# Patient Record
Sex: MALE | Race: AMERICAN INDIAN OR ALASKA NATIVE
[De-identification: names, ages, dates, MRNs, and addresses within clinical notes are randomized per-mention and may not be internally consistent; named-entity substitution may affect disease eponyms.]

---

## 2017-04-10 ENCOUNTER — HOSPITAL ENCOUNTER (INPATIENT)
Dept: HOSPITAL 31 - C.ER | Age: 57
LOS: 5 days | Discharge: HOME | DRG: 745 | End: 2017-04-15
Attending: PSYCHIATRY & NEUROLOGY | Admitting: PSYCHIATRY & NEUROLOGY
Payer: MEDICAID

## 2017-04-10 DIAGNOSIS — F11.23: Primary | ICD-10-CM

## 2017-04-10 DIAGNOSIS — F19.10: ICD-10-CM

## 2017-04-10 DIAGNOSIS — G47.00: ICD-10-CM

## 2017-04-10 DIAGNOSIS — Z87.891: ICD-10-CM

## 2017-04-10 LAB
ALBUMIN/GLOB SERPL: 1.4 {RATIO} (ref 1–2.1)
ALP SERPL-CCNC: 72 U/L (ref 38–126)
ALT SERPL-CCNC: 21 U/L (ref 21–72)
AST SERPL-CCNC: 20 U/L (ref 17–59)
BASOPHILS # BLD AUTO: 0 K/UL (ref 0–0.2)
BASOPHILS NFR BLD: 0.4 % (ref 0–2)
BILIRUB SERPL-MCNC: < 0.1 MG/DL (ref 0.2–1.3)
BILIRUB UR-MCNC: NEGATIVE MG/DL
BUN SERPL-MCNC: 12 MG/DL (ref 9–20)
CALCIUM SERPL-MCNC: 8.9 MG/DL (ref 8.6–10.4)
CHLORIDE SERPL-SCNC: 100 MMOL/L (ref 98–107)
CO2 SERPL-SCNC: 26 MMOL/L (ref 22–30)
EOSINOPHIL # BLD AUTO: 0.5 K/UL (ref 0–0.7)
EOSINOPHIL NFR BLD: 6.8 % (ref 0–4)
ERYTHROCYTE [DISTWIDTH] IN BLOOD BY AUTOMATED COUNT: 13.4 % (ref 11.5–14.5)
ETHANOL SERPL-MCNC: < 10 MG/DL (ref 0–10)
GLOBULIN SER-MCNC: 2.6 GM/DL (ref 2.2–3.9)
GLUCOSE SERPL-MCNC: 121 MG/DL (ref 75–110)
GLUCOSE UR STRIP-MCNC: NORMAL MG/DL
HCT VFR BLD CALC: 40.2 % (ref 35–51)
KETONES UR STRIP-MCNC: NEGATIVE MG/DL
LEUKOCYTE ESTERASE UR-ACNC: (no result) LEU/UL
LYMPHOCYTES # BLD AUTO: 2 K/UL (ref 1–4.3)
LYMPHOCYTES NFR BLD AUTO: 24.6 % (ref 20–40)
MCH RBC QN AUTO: 30.8 PG (ref 27–31)
MCHC RBC AUTO-ENTMCNC: 32.6 G/DL (ref 33–37)
MCV RBC AUTO: 94.4 FL (ref 80–94)
MONOCYTES # BLD: 0.5 K/UL (ref 0–0.8)
MONOCYTES NFR BLD: 5.8 % (ref 0–10)
NRBC BLD AUTO-RTO: 0 % (ref 0–2)
PH UR STRIP: 6 [PH] (ref 5–8)
PLATELET # BLD: 258 K/UL (ref 130–400)
PMV BLD AUTO: 7.3 FL (ref 7.2–11.7)
POTASSIUM SERPL-SCNC: 4.6 MMOL/L (ref 3.6–5.2)
PROT SERPL-MCNC: 6.2 G/DL (ref 6.3–8.3)
PROT UR STRIP-MCNC: NEGATIVE MG/DL
RBC # UR STRIP: NEGATIVE /UL
RBC #/AREA URNS HPF: < 1 /HPF (ref 0–3)
SODIUM SERPL-SCNC: 137 MMOL/L (ref 132–148)
SP GR UR STRIP: 1.02 (ref 1–1.03)
UROBILINOGEN UR-MCNC: NORMAL MG/DL (ref 0.2–1)
WBC # BLD AUTO: 8.1 K/UL (ref 4.8–10.8)
WBC #/AREA URNS HPF: 1 /HPF (ref 0–5)

## 2017-04-10 PROCEDURE — GZHZZZZ GROUP PSYCHOTHERAPY: ICD-10-PCS | Performed by: PSYCHIATRY & NEUROLOGY

## 2017-04-10 PROCEDURE — GZ56ZZZ INDIVIDUAL PSYCHOTHERAPY, SUPPORTIVE: ICD-10-PCS | Performed by: PSYCHIATRY & NEUROLOGY

## 2017-04-10 PROCEDURE — HZ2ZZZZ DETOXIFICATION SERVICES FOR SUBSTANCE ABUSE TREATMENT: ICD-10-PCS | Performed by: PSYCHIATRY & NEUROLOGY

## 2017-04-10 NOTE — C.PDOC
History Of Present Illness


56-year-old male, presents to the emergency department requesting detox from 

heroin. Patient states he is not using IV. Patients last use was yesterday. 

Denies any SI/HI. No other complaints at this time.


Time Seen by Provider: 04/10/17 13:12


Chief Complaint (Nursing): Substance Abuse


History Per: Patient


History/Exam Limitations: no limitations


Onset/Duration Of Symptoms: Days


Current Symptoms Are (Timing): Still Present





Past Medical History


Reviewed: Historical Data, Nursing Documentation, Vital Signs


Vital Signs: 


 Last Vital Signs











Temp  98 F   04/10/17 16:37


 


Pulse  62   04/10/17 16:37


 


Resp  18   04/10/17 16:37


 


BP  121/73   04/10/17 16:37


 


Pulse Ox  100   04/10/17 17:28














- Medical History


PMH: Back Problems


   Denies: Diabetes, Hepatitis, HIV, HTN, Seizures, Sexually Transmitted Disease





- [facility] Procedures








DETOXIFICATION SERVICES FOR SUBSTANCE ABUSE TREATMENT (04/09/16)


DRUG DETOXIFICATION (01/09/15)








Family History: States: Unknown Family Hx





- Social History


Hx Tobacco Use: Yes


Hx Alcohol Use: Yes


Hx Substance Use: Yes (heroin)





- Immunization History


Hx Tetanus Toxoid Vaccination: No


Hx Influenza Vaccination: No


Hx Pneumococcal Vaccination: No





Review Of Systems


Except As Marked, All Systems Reviewed And Found Negative.


Constitutional: Negative for: Fever, Chills


Cardiovascular: Negative for: Chest Pain, Palpitations


Respiratory: Negative for: Shortness of Breath


Skin: Negative for: Rash


Neurological: Negative for: Weakness, Numbness


Psych: Negative for: Anxiety, Depression, Suicidal ideation





Physical Exam





- Physical Exam


Appears: Non-toxic, No Acute Distress


Skin: Warm, Dry, No Rash


Head: Atraumatic, Normacephalic


Eye(s): bilateral: Normal Inspection, EOMI


Nose: Normal


Oral Mucosa: Moist


Lips: Normal Appearing


Neck: Normal ROM


Chest: Symmetrical


Respiratory: No Accessory Muscle Use


Gastrointestinal/Abdominal: Soft, No Tenderness


Extremity: Normal ROM


Neurological/Psych: Oriented x3





ED Course And Treatment





- Laboratory Results


Result Diagrams: 


 04/10/17 13:40





 04/10/17 13:40


O2 Sat by Pulse Oximetry: 100


Progress Note: Case discussed w/ crisis worker, who evaluated patient and 

states he will be admitted to Dr Jc service for opiate dependance.





Disposition





- Disposition


Disposition: HOSPITALIZED


Disposition Time: 16:00


Condition: STABLE





- Clinical Impression


Clinical Impression: 


 Opioid dependence





- Scribe Statement


The provider has reviewed the documentation as recorded by the Scribe


Roman Louise





All medical record entries made by the Mannibe were at my direction and 

personally dictated by me. I have reviewed the chart and agree that the record 

accurately reflects my personal performance of the history, physical exam, 

medical decision making, and the department course for this patient. I have 

also personally directed, reviewed, and agree with the discharge instructions 

and disposition.

## 2017-04-11 NOTE — PCM.PSYCH
Initial Psychiatric Evaluation





- Initial Psychiatric Evaluation


Type of Admission: Voluntary


Legal Status: Capacity


Chief Complaint (in patient's own words): 


Heroin detox


History of Present Illness and Precipitating Events: 


Pt. is a 55 y/o AA M, domiciled and unemployed on disability, w/ PMHx of opiate 

abuse disorder. Pt. reports that he predominantly uses heroin, although, he 

admits to also abusing xanax, and using crack cocaine. Pt. states that his 

father was an alcoholic; Pt. alludes to psychiatric hx on his mothers side as 

well. Pt. was last admitted admitted a year ago for detox. Upon discharge, Pt. 

was referred to [facility], but there was a waitlist. Pt. states that he 

stayed clean for two months, but relapsed before space opened up for him at 

[facility]. Pt. is now concerned that his current living situation would be 

a difficult environment in which to remain sober due to the ubiquity of drugs 

in his apartment complex. Pt. is interested in seeking treatment in a long-term 

rehab facility upon discharge, and he is also interested in looking for sober-

living homes. 


Current Medications: 





Active Medications











Generic Name Dose Route Start Last Admin





  Trade Name Freq  PRN Reason Stop Dose Admin


 


Al Hydrox/Mg Hydrox/Simethicone  30 ml 04/10/17 15:35  





  Maalox 30 Ml  PO   





  TID PRN   





  Indigestion / Heartburn   


 


Buprenorphine HCl  8 mg 04/12/17 10:00  





  Subutex  SL 04/16/17 09:59  





  .TAPER POONAM   





  Taper   


 


Clonidine HCl  0.1 mg 04/10/17 15:35  





  Catapres  PO   





  Q8 PRN   





  COWS Score More or Equal to 5   


 


Gabapentin  300 mg 04/10/17 18:00 04/11/17 13:18





  Neurontin  PO   300 mg





  TID POONAM   Administration


 


Hydroxyzine HCl  50 mg 04/10/17 15:35 04/10/17 17:38





  Atarax  PO   50 mg





  Q6H PRN   Administration





  Anxiety   


 


Ibuprofen  600 mg 04/10/17 15:35  





  Motrin Tab  PO   





  Q6H PRN   





  Pain, moderate (4-7)   


 


Loperamide HCl  2 mg 04/10/17 15:35  





  Imodium  PO   





  Q8 PRN   





  Diarrhea   


 


Nicotine  1 patch 04/11/17 10:00 04/11/17 09:15





  Nicoderm Cq  TD   1 patch





  DAILY POONAM   Administration


 


Ondansetron HCl  4 mg 04/10/17 15:35  





  Zofran Tab  PO   





  Q8 PRN   





  Nausea/Vomiting   


 


Trazodone HCl  100 mg 04/10/17 22:00  





  Desyrel  PO   





  HS PRN   





  Insomnia   














Past Psychiatric History





- Past Psychiatric History


Previous Treatment History: Inpatient


Prior Psychiatric Treatment: Heroin detox


At [facility] hospital: [facility]


Date: 04/14/16


Duration: 7 days


Pertinent Medical Hx (Current Medical&Sleep Prob, Allergies): 





 Allergies











Allergy/AdvReac Type Severity Reaction Status Date / Time


 


FISH Allergy Intermediate  Verified 04/10/17 12:35


 


nut - unspecified [nut] Allergy Intermediate  Verified 04/10/17 12:35








 





Gabapentin [Neurontin] 300 mg PO TID #90 cap 04/14/16 


hydrOXYzine HCl [Atarax] 25 mg PO BID PRN #60 tab 04/14/16 


traZODone [Desyrel] 100 mg PO HS #30 tab 04/14/16 











Review of Systems





- Psychiatric


Psychiatric: absent: Auditory Hallucinations, Visual Hallucinations





Mental Status Examination





- Personal Presentation


Personal Presentation: Looks stated age





- Affect


Affect: Constricted





- Motor Activity


Motor Activity: Calm





- Reliability in Providing Information


Reliability in Providing Information: Good





- Speech


Speech: Organized, Relevant





- Mood


Mood: Depressed





- Formal Thought Process


Formal Thought Process: No Impairment





- Obsessions/Compulsions


Obsessions: No


Compulsions: No





- Cognitive Functions


Orientation: Person, Place, Situation


Attention/Concentration: Attentive


Judgement: Intact, as evidence by: Insight regarding need for hospitalization


Memory: Recent intact, as evidence by: Ability to recall events of the day, 

Remote intact, as evidenced by: Abilit to recall sig. life events





DSM 5 DX





- DSM 5


DSM 5 Diagnosis: 


Opioid withdrawal


Opioid use disorder





- Recommended/Plan of Treatment


Treatment Recommendations and Plan of Treatment: 


Opiod withdrawal


-subutex detox


-prn meds


Opioid use disorder


-MI for abstinence


-Individual therapy, group therapy


-Supportive therapy

## 2017-04-12 RX ADMIN — BUPRENORPHINE HCL SCH MG: 2 TABLET SUBLINGUAL at 09:51

## 2017-04-12 NOTE — PCM.PYCHPN
Psychiatric Progress Note





- Psychiatric Progress Note


Patient seen today, length of contact: 17 min


Patient Chief Complaint: 


"Sleep was so so"


Problems Identified/Issues Discussed: 


The pt is seen, chart reviewed, case discussed with staff.


The pt is compliant with medications and reports no side-effects.


Symptoms of withdrawal are improving but needs more time to stabilize. 


After care discussed, support and psychoeducation given. he will go to [facility].


Medication Change: Yes (detox changes daily)


Medical Record Reviewed: Yes





Mental Status Examination





- Cognitive Function


Orientation: Person, Place, Situation, Time


Memory: Intact


Attention: WNL


Concentration: WNL


Association: WNL


Fund of Knowledge: WNL





- Mood


Mood: Depressed





- Affect


Affect: Constricted





- Speech


Speech: Appropriate





- Formal Thought Process


Formal Thought Process: No Impairment





- Suicidal Ideation


Suicidal Ideation: No





- Homicidal Ideation


Homicidal Ideation: No





Goal/Treatment Plan





- Goal/Treatment Plan


Need for Continued Stay: Discharge may exacerbated symptoms, Severe functional 

impairment


Progress Toward Problem(s) and Goals/Treatment Plan: 


Opiod withdrawal


-subutex detox


-prn meds


Opioid use disorder


-MI for abstinence


-Individual therapy, group therapy


-Supportive therapy


Estimated Date of D/C: 04/15/17

## 2017-04-13 RX ADMIN — BUPRENORPHINE HCL SCH MG: 2 TABLET SUBLINGUAL at 09:44

## 2017-04-13 RX ADMIN — ALUMINUM HYDROXIDE AND MAGNESIUM HYDROXIDE PRN ML: 200; 200 SUSPENSION ORAL at 21:54

## 2017-04-13 NOTE — PCM.PYCHPN
Psychiatric Progress Note





- Psychiatric Progress Note


Patient seen today, length of contact: 16 min


Patient Chief Complaint: 


"A little better"


Problems Identified/Issues Discussed: 


The pt is seen, chart reviewed, case discussed with staff.


The pt is compliant with medications and reports no side-effects.


Symptoms are still improving but needs more time to stabilize.  No breakthru 

sxs reported. 


Slept better


After care discussed, support and psychoeducation given. No chg: [facility]








Medication Change: Yes (detox changes daily)


Medical Record Reviewed: Yes





Mental Status Examination





- Cognitive Function


Orientation: Person, Place, Situation


Memory: Intact


Attention: WNL


Concentration: WNL


Association: WN


Fund of Knowledge: WNL





- Mood


Mood: Depressed





- Affect


Affect: Constricted





- Speech


Speech: Appropriate





- Formal Thought Process


Formal Thought Process: No Impairment





- Suicidal Ideation


Suicidal Ideation: No





- Homicidal Ideation


Homicidal Ideation: No





Goal/Treatment Plan





- Goal/Treatment Plan


Need for Continued Stay: Discharge may exacerbated symptoms, Severe functional 

impairment


Progress Toward Problem(s) and Goals/Treatment Plan: 


Opiod withdrawal


-subutex detox


-prn meds


Opioid use disorder


-MI for abstinence


-Individual therapy, group therapy


-Supportive therapy


Estimated Date of D/C: 04/15/17

## 2017-04-14 RX ADMIN — ALUMINUM HYDROXIDE AND MAGNESIUM HYDROXIDE PRN ML: 200; 200 SUSPENSION ORAL at 20:52

## 2017-04-14 RX ADMIN — BUPRENORPHINE HCL SCH MG: 2 TABLET SUBLINGUAL at 10:05

## 2017-04-14 NOTE — PCM.PYCHPN
Psychiatric Progress Note





- Psychiatric Progress Note


Patient seen today, length of contact: 15 min


Patient Chief Complaint: 


"OK today"


Problems Identified/Issues Discussed: 


The pt is seen, chart reviewed, case discussed with staff.


The pt is compliant with medications and reports no side-effects. 


Detox is improving without breakthru sxs. No cravings but he will do suboxone 

at [facility]. 


Support and psychoeducation given.


MI used








Medication Change: Yes (detox changes daily)


Medical Record Reviewed: Yes





Mental Status Examination





- Cognitive Function


Orientation: Person, Place, Situation


Memory: Intact


Attention: WNL


Concentration: WNL


Association: WNL


Fund of Knowledge: WNL





- Mood


Mood: Depressed





- Affect


Affect: Constricted





- Speech


Speech: Appropriate





- Formal Thought Process


Formal Thought Process: No Impairment





- Suicidal Ideation


Suicidal Ideation: No





- Homicidal Ideation


Homicidal Ideation: No





Goal/Treatment Plan





- Goal/Treatment Plan


Need for Continued Stay: Discharge may exacerbated symptoms, Severe functional 

impairment


Progress Toward Problem(s) and Goals/Treatment Plan: 


Opiod withdrawal


-subutex detox, ending soon


-prn meds





Opioid use disorder


-MI for abstinence


-Individual therapy, group therapy


-Supportive therapy


-MAT discussed


Estimated Date of D/C: 04/15/17

## 2017-04-15 VITALS — TEMPERATURE: 97.8 F | RESPIRATION RATE: 18 BRPM

## 2017-04-15 VITALS — OXYGEN SATURATION: 96 % | DIASTOLIC BLOOD PRESSURE: 83 MMHG | SYSTOLIC BLOOD PRESSURE: 128 MMHG | HEART RATE: 81 BPM

## 2017-04-15 RX ADMIN — BUPRENORPHINE HCL SCH MG: 2 TABLET SUBLINGUAL at 09:28

## 2017-04-15 NOTE — PCM.PYCHDC
Mental Status Examination





- Mental Status Examination


Orientation: Person, Place, Situation, Time


Memory: Intact


Mood: Anxious


Affect: Constricted


Speech: Appropriate


Attention: WNL


Concentration: WNL


Association: WNL


Fund of Knowledge: WNL


Formal Thought Process: No Impairment


Suicidal Ideation: No


Current Homicidal Ideation?: No





Discharge Summary





- Discharge Note


Reason for Hospitalization: 


heroin detox


Consultations:: List each consultation separately and include:  1. Reason for 

request.  2. Findings.  3. Follow-up


Summary of Hospital Course include:: 1. Description of specific treatment plan 

utilized for patients during their course of treatmen.  2. Summarize the time-

course for resolution of acute symptoms and/or regressed behaviors.  3. 

Describe issues identified and worked on during hospitalization.  4. Describe 

medication utilized.  5. Describe medical problems identified and treated.  6. 

Reassessment of suicide risk


Summary of Hospital Course: 


The pt is seen, chart reviewed and case discussed





On admission:


Pt. is a 55 y/o AA M, domiciled and unemployed on disability, w/ PMHx of opiate 

use disorder. Pt. reports that he predominantly uses heroin, although, he 

admits to also abusing xanax, and using crack cocaine. Pt. states that his 

father was an alcoholic; Pt. alludes to psychiatric hx on his mothers side as 

well. Pt. was last admitted admitted a year ago for detox. Upon discharge, Pt. 

was referred to [facility], but there was a waitlist. Pt. states that he 

stayed clean for two months, but relapsed before space opened up for him at 

[facility]. Pt. is now concerned that his current living situation would be 

a difficult environment in which to remain sober due to the ubiquity of drugs 

in his apartment complex. Pt. is interested in seeking treatment in a long-term 

rehab facility upon discharge, and he is also interested in looking for sober-

living homes. 





Hospital course:


The pt was admitted and started on treatment with psychotherapy, support, 

psychoeducation and medications. MI and CBT used.


The pt attended groups and activities, as well as milieu therapy.


All the risks and benefits of medications are discussed and the patient 

understood and agreed.


After care discussed with the patient. Agreed to go to [facility] and also 

do subox.


He was cooperative, motivated and pleasant





- Final Diagnosis (DSM 5)


Condition upon Discharge: STABLE


DSM 5: 


Opioid withdrawal


Opioid use d/o - severe


Disposition: HOME/ ROUTINE


Follow-up Treatment Plan: 


Attend [facility] on 4/17/17


Consider MAT


Continue below medications after discharge.


Use relapse prevention skills


Return to ER or call 911 if suicidal, homicidal or symptoms relapse.


Stay away from stress, alcohol and drugs.


Prescriptions/Medication Reconciliation: 


hydrOXYzine HCl [Atarax] 50 mg PO BID PRN #60 tab


 PRN Reason: Anxiety


traZODone [Desyrel] 100 mg PO HS PRN #30 tab


 PRN Reason: Insomnia


Gabapentin [Neurontin] 300 mg PO TID #90 cap

## 2017-12-26 ENCOUNTER — HOSPITAL ENCOUNTER (INPATIENT)
Dept: HOSPITAL 31 - C.ER | Age: 57
LOS: 5 days | Discharge: HOME | DRG: 895 | End: 2017-12-31
Attending: PSYCHIATRIC UNIT | Admitting: PSYCHIATRIC UNIT
Payer: MEDICARE

## 2017-12-26 VITALS — BODY MASS INDEX: 25.1 KG/M2

## 2017-12-26 DIAGNOSIS — F14.10: ICD-10-CM

## 2017-12-26 DIAGNOSIS — F41.9: ICD-10-CM

## 2017-12-26 DIAGNOSIS — F13.239: ICD-10-CM

## 2017-12-26 DIAGNOSIS — F11.23: Primary | ICD-10-CM

## 2017-12-26 DIAGNOSIS — F17.210: ICD-10-CM

## 2017-12-26 LAB
ALBUMIN SERPL-MCNC: 4 G/DL (ref 3.5–5)
ALBUMIN/GLOB SERPL: 1.3 {RATIO} (ref 1–2.1)
ALT SERPL-CCNC: 21 U/L (ref 21–72)
AST SERPL-CCNC: 23 U/L (ref 17–59)
BASOPHILS # BLD AUTO: 0 K/UL (ref 0–0.2)
BASOPHILS NFR BLD: 0.5 % (ref 0–2)
BILIRUB UR-MCNC: NEGATIVE MG/DL
BUN SERPL-MCNC: 10 MG/DL (ref 9–20)
CALCIUM SERPL-MCNC: 8.8 MG/DL (ref 8.6–10.4)
CAOX CRY #/AREA URNS HPF: (no result) /HPF
EOSINOPHIL # BLD AUTO: 0.4 K/UL (ref 0–0.7)
EOSINOPHIL NFR BLD: 5 % (ref 0–4)
ERYTHROCYTE [DISTWIDTH] IN BLOOD BY AUTOMATED COUNT: 14.1 % (ref 11.5–14.5)
GFR NON-AFRICAN AMERICAN: > 60
GLUCOSE UR STRIP-MCNC: NORMAL MG/DL
HGB BLD-MCNC: 14.1 G/DL (ref 12–18)
LEUKOCYTE ESTERASE UR-ACNC: (no result) LEU/UL
LYMPHOCYTES # BLD AUTO: 2 K/UL (ref 1–4.3)
LYMPHOCYTES NFR BLD AUTO: 24.3 % (ref 20–40)
MCH RBC QN AUTO: 31.4 PG (ref 27–31)
MCHC RBC AUTO-ENTMCNC: 33.3 G/DL (ref 33–37)
MCV RBC AUTO: 94.5 FL (ref 80–94)
MONOCYTES # BLD: 0.7 K/UL (ref 0–0.8)
MONOCYTES NFR BLD: 8.9 % (ref 0–10)
NEUTROPHILS # BLD: 5 K/UL (ref 1.8–7)
NEUTROPHILS NFR BLD AUTO: 61.3 % (ref 50–75)
NRBC BLD AUTO-RTO: 0.1 % (ref 0–2)
PH UR STRIP: 5 [PH] (ref 5–8)
PLATELET # BLD: 287 K/UL (ref 130–400)
PMV BLD AUTO: 7.1 FL (ref 7.2–11.7)
PROT UR STRIP-MCNC: NEGATIVE MG/DL
RBC # BLD AUTO: 4.49 MIL/UL (ref 4.4–5.9)
RBC # UR STRIP: NEGATIVE /UL
SP GR UR STRIP: 1.03 (ref 1–1.03)
SQUAMOUS EPITHIAL: 1 /HPF (ref 0–5)
URINE NITRATE: NEGATIVE
UROBILINOGEN UR-MCNC: 2 MG/DL (ref 0.2–1)
WBC # BLD AUTO: 8.1 K/UL (ref 4.8–10.8)

## 2017-12-26 PROCEDURE — HZ46ZZZ GROUP COUNSELING FOR SUBSTANCE ABUSE TREATMENT, PSYCHOEDUCATION: ICD-10-PCS | Performed by: PSYCHIATRIC UNIT

## 2017-12-26 PROCEDURE — HZ2ZZZZ DETOXIFICATION SERVICES FOR SUBSTANCE ABUSE TREATMENT: ICD-10-PCS | Performed by: PSYCHIATRIC UNIT

## 2017-12-26 PROCEDURE — HZ59ZZZ INDIVIDUAL PSYCHOTHERAPY FOR SUBSTANCE ABUSE TREATMENT, SUPPORTIVE: ICD-10-PCS | Performed by: PSYCHIATRIC UNIT

## 2017-12-26 NOTE — C.PDOC
History Of Present Illness


57 year old male, with no significant PMHx, presents to ED requesting detox 

from heroin and Xanax. Last use of Xanax and heroin both today. Pt reports 

snorting 5-6 bags of heroin today. Denies SI, HI, or any active physical 

complaints at this time.





Time Seen by Provider: 12/26/17 18:17


Chief Complaint (Nursing): Substance Abuse


History Per: Patient


History/Exam Limitations: no limitations


Onset/Duration Of Symptoms: Days


Current Symptoms Are (Timing): Still Present


Suicide/Self Injury Attempted (Context): None


Severity: None


Pain Scale Rating Of: 0


Associated Symptoms: denies: Suicidal Thoughts, Suicidal Plan


Involuntary Hold By: None


Recent travel outside of the [place]: No


Additional History Per: Patient





Past Medical History


Reviewed: Historical Data, Nursing Documentation, Vital Signs


Vital Signs: 


 Last Vital Signs











Temp  99.1 F   12/26/17 16:30


 


Pulse  87   12/26/17 16:30


 


Resp  18   12/26/17 16:30


 


BP  125/74   12/26/17 16:30


 


Pulse Ox  95   12/26/17 18:39














- Medical History


PMH: Back Problems


   Denies: Diabetes, Hepatitis, HIV, HTN, Seizures, Sexually Transmitted Disease





- [facility] Procedures








DETOXIFICATION SERVICES FOR SUBSTANCE ABUSE TREATMENT (04/10/17)


DRUG DETOXIFICATION (01/09/15)


GROUP PSYCHOTHERAPY (04/10/17)


INDIVIDUAL PSYCHOTHERAPY, SUPPORTIVE (04/10/17)








Family History: States: Stroke





- Social History


Hx Tobacco Use: Yes


Hx Alcohol Use: Yes


Hx Substance Use: Yes (heroin)





- Immunization History


Hx Tetanus Toxoid Vaccination: No


Hx Influenza Vaccination: No


Hx Pneumococcal Vaccination: No





Review Of Systems


Except As Marked, All Systems Reviewed And Found Negative.


Constitutional: Negative for: Fever, Chills


Cardiovascular: Negative for: Chest Pain, Palpitations


Respiratory: Negative for: Cough, Shortness of Breath


Gastrointestinal: Negative for: Nausea, Vomiting, Abdominal Pain


Neurological: Negative for: Headache, Dizziness


Psych: Negative for: Suicidal ideation





Physical Exam





- Physical Exam


Appears: Non-toxic, No Acute Distress, Other (calm, cooperative)


Skin: Warm, Dry, Other (chronic darkening of b/l lower extremity )


Head: Atraumatic, Normacephalic


Eye(s): bilateral: Abnormal Pupil (constricted)


Oral Mucosa: Moist


Neck: Normal ROM, Supple


Chest: Symmetrical


Cardiovascular: Rhythm Regular, No Murmur


Respiratory: No Accessory Muscle Use, No Rales, No Rhonchi, No Wheezing


Gastrointestinal/Abdominal: Soft, No Tenderness


Extremity: Normal ROM, No Pedal Edema


Neurological/Psych: Oriented x3, Normal Speech





ED Course And Treatment





- Laboratory Results


Result Diagrams: 


 12/26/17 17:47





 12/26/17 17:47


Lab Interpretation: Abnormal (tox + cocaine, opiates, benzo's)


O2 Sat by Pulse Oximetry: 95 (RA)


Pulse Ox Interpretation: Normal


Reevaluation Time: 19:52


Reassessment Condition: Unchanged





- Physician Consult Information


Outcome Of Conversation: 1945: ok to detox





Medical Decision Making


Medical Decision Making: 


Blood work, UA ordered and reviewed.


polysubstance abuse- prescreened for detox.





Disposition


Doctor Will See Patient In The: Hospital


Counseled Patient/Family Regarding: Studies Performed, Diagnosis





- Disposition


Disposition: HOSPITALIZED


Disposition Time: 19:53


Condition: GOOD


Forms:  [facility] (English)





- Clinical Impression


Clinical Impression: 


 Polysubstance (including opioids) dependence with physiol dependence








- Scribe Statement


The provider has reviewed the documentation as recorded by the Scribe


Paulina Hanson





All medical record entries made by the Scribe were at my direction and 

personally dictated by me. I have reviewed the chart and agree that the record 

accurately reflects my personal performance of the history, physical exam, 

medical decision making, and the department course for this patient. I have 

also personally directed, reviewed, and agree with the discharge instructions 

and disposition.

## 2017-12-26 NOTE — PCM.BM
<Teena Ghosh - Last Filed: 12/26/17 20:37>





Treatment Plan Problems





- Problems identified on initial assessmt


  ** Potential for benzo withdrawal


Date Initiated: 12/26/17


Time Initiated: 20:38


Assessment reference: NA


Status: Active


Priority: 1





  ** Potential for opiate withdrawal


Date Initiated: 12/26/17


Time Initiated: 20:38


Assessment reference: NA


Status: Active


Priority: 2





Treatment assets and liabiliti


Patient Assests: cooperative, ADL independent, negotiates basic needs, 

cognitively intact


Patient Liabilities: substance abuse (Benzo, Opiates)





- Milieu Protocol


Maintain good personal hygiene: daily Encourage regular showers, daily Remind 

patient to perform daily oral care, daily Assist patient to perform ADL's


Conduct patient checks and document Observation sheet: Q15 minutes


Maintain personal safety: every shift Educate patient to report safety concerns 

to staff, every shift Monitor environment for contraband/sharps


Medication safety: Monitor for expected outcome, potential side effects: every 

shift, Assess barriers to learning: every shift, Assess readiness for 

medication education: every shift





<Merry Santana - Last Filed: 12/27/17 13:57>





Family Contact


Family involvement: Patient does not wish Family/SO involvement





- Goals for Treatment


Patient goals for treatment: COMPLETE DETOX AND APPLY FOR LONG-TERM REHAB.





Discharge/Continuing Care





- Education Needs


Education Needs: Patient Medication, Patient Diagnosis/Disease Process, Patient 

Coping Skills, Patient Anger Management skills, Patient Placement options, 

Patient Community resources





- Discharge


Discharge Criteria: No longer exhibiting s/s of withdrawal, Reduction of target 

symptoms


Discharge to:: Substance Abuse Rehab





- Treatment Team Participation


Patient/Family/SO Statement: 





12/27/17 13:57


"I WANNA GO TO LONG TERM..."

## 2017-12-27 NOTE — PCM.PSYCH
Initial Psychiatric Evaluation





- Initial Psychiatric Evaluation


Type of Admission: Voluntary


Legal Status: Capacity


Chief Complaint (in patient's own words): 





"I want to stay clean"


History of Present Illness and Precipitating Events: 


This 57-year-old single -American male who was admitted to the hospital 

for the detox of heroin and Xanax. 


Patient reported he used 5-6 bags of heroine on the daily basis for the last 20 

years intranasally patient reported he had 2 detox in the past. Last detox was 

2 years ago at the [facility]. Patient stated the longest period of 

sobriety is only 7 months. Patient stated he wanted to stay clean and sober and 

a one to discharge to inpatient rehabilitation after the discharge. Patient 

reported withdrawal symptoms of heroine including nasal condition [place] eyes 

hot and cold sweats nausea and stomach cramps muscle aches etc.


Patient reported he is taking Xanax 2 mg tablet 3 times a day he started buying 

and taking the tablet one year ago last use was yesterday patient reported some 

withdrawal symptoms from the benzos. CAGE questionnaire was positive.


Patient reported he is using cocaine on and off for the last 20 years. Last use 

was yesterday.


Patient denied any history of incarceration probation parole, DUI and DWI.


Patient reported he is smoking half pack of cigarettes on the daily basis. He 

reported craving and asked the nicotine patch.


Patient denied depressive manic and anxiety symptoms. Additionally he denied 

auditory visual hallucination including the perceptual disturbances. 


Past medical history: Denied


Family history patient stated both parents were alcoholic





Current Medications: 





Active Medications











Generic Name Dose Route Start Last Admin





  Trade Name Freq  PRN Reason Stop Dose Admin


 


Al Hydrox/Mg Hydrox/Simethicone  30 ml  12/26/17 21:53  12/26/17 22:04





  Maalox 30 Ml  PO   30 ml





  Q8 PRN   Administration





  Indigestion / Heartburn   


 


Benzocaine/Menthol  1 brianna  12/27/17 11:31  





  Cepacol Sore Throat  PO   





  QID PRN   





  Sore Throat   


 


Clonidine HCl  0.1 mg  12/26/17 21:05  





  Catapres  PO   





  Q8 PRN   





  opiate withdrawal   


 


Gabapentin  100 mg  12/27/17 14:00  12/27/17 13:12





  Neurontin  PO   100 mg





  TID POONAM   Administration


 


Hydroxyzine HCl  25 mg  12/26/17 21:06  12/26/17 21:41





  Atarax  PO   25 mg





  Q8 PRN   Administration





  Anxiety   


 


Ibuprofen  400 mg  12/27/17 11:33  





  Motrin Tab  PO   





  Q6H PRN   





  Pain, moderate (4-7)   


 


Loperamide HCl  2 mg  12/27/17 11:31  





  Imodium  PO   





  Q8 PRN   





  Diarrhea   


 


Lorazepam  1 mg  12/26/17 21:30  





  Ativan  PO   





  Q6 PRN   





  benzo withdrawal   


 


Lorazepam  2 mg  12/27/17 12:30  





  Ativan  PO  01/01/18 12:29  





  Q6 POONAM   





  Taper   


 


Nicotine  1 patch  12/27/17 13:00  12/27/17 13:29





  Nicoderm Cq  TD   1 patch





  DAILY POONAM   Administration


 


Ondansetron HCl  4 mg  12/27/17 11:31  





  Zofran Tab  PO   





  Q8 PRN   





  Nausea/Vomiting   


 


Trazodone HCl  50 mg  12/26/17 21:06  12/26/17 21:42





  Desyrel  PO   50 mg





  HS PRN   Administration





  Insomnia   








NKDA





Past Psychiatric History





- Past Psychiatric History


Previous Treatment History: Inpatient


Prior Psychiatric Treatment: had 2 detox in the past


At [facility] hospital: [facility] detox was in [facility] 


History of Abuse: 





Denied


History of ETOH/Drug Use: 





Please see HPI


History of Family Illness: 





Both parent were alcoholic


Pertinent Medical Hx (Current Medical&Sleep Prob, Allergies): 





 Allergies











Allergy/AdvReac Type Severity Reaction Status Date / Time


 


FISH Allergy Intermediate  Verified 12/26/17 16:30


 


nut - unspecified [nut] Allergy Intermediate  Verified 12/26/17 16:30








 





RX: No Known Home Med  12/26/17 











Review of Systems





- Review of Systems


Systems not reviewed;Unavailable: Other (cooperative, anxious)


All systems: reviewed and no additional remarkable complaints except (please 

see HPI)





- Constitutional


Constitutional: Chills, Sweats, Weakness, Malaise





- EENT


Eyes: Discharge


Ears: UNREMARKABLE


Nose/Mouth/Throat: Other (yawning)





- Cardiovascular


Additional comments: 





heart racing





- Gastrointestinal


Gastrointestinal: Diarrhea





- Genitourinary


Genitourinary: UNREMARKABLE





- Reproductive: Male


Reproductive:Male: UNREMARKABLE





- Musculoskeletal


Musculoskeletal: Myalgias





- Integumentary


Integumentary: UNREMARKABLE





- Neurological


Neurological: UNREMARKABLE





- Psychiatric


Psychiatric: As Per HPI





Mental Status Examination





- Personal Presentation


Personal Presentation: Looks stated age


Additional comments: 





Calm and cooperative but appeared anxious





- Affect


Affect: Constricted





- Motor Activity


Motor Activity: Calm





- Reliability in Providing Information


Reliability in Providing Information: Good





- Speech


Speech: Organized





- Formal Thought Process


Formal Thought Process: No Impairment





- Hallucinations/Delusions


Hallucinations: Other (Denied)


Delusions: Other (Denied)





- Obsessions/Compulsions


Obsessions: No


Compulsions: No





- Cognitive Functions


Orientation: Person, Place, Situation, Time


Sensorium: Alert


Attention/Concentration: Attentive


Abstract Thinking: As evidence by abstract perception of proverbs


Judgement: Intact, as evidence by: Good judgement, Intact, as evidence by: 

Insight regarding need for hospitalization


Memory: Recent intact, as evidence by: Ability to recall events of the day





- Risk


Risk: Withdrawal, Falls, Diminished functioning





- Strength & Assets Inventory


Strength & Assets Inventory: Family support, Cooperative





- Limitations


Limitations: Other (Chronic substance abuse)





DSM 5 DX





- DSM 5


DSM 5 Diagnosis: 





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


Hypnotic/sedated use disorder severe dependence, withdrawal symptoms


Cocaine use disorder, moderate


Tobacco use disorder, moderate





- Recommended/Plan of Treatment


Treatment Recommendations and Plan of Treatment: 





Methadone and ativan detox


Gabapentin for augmentation


As needed meds and vitamins


Attend groups and activities


MI for abstinence and CBT for relapse prevention


Support and psychoeducation


Consider and encourage MAT~


Refer to after care





33 min


Projected ELOS: 5 days


Prognosis: Good with the medication and completion of detox treatment


Discharge Plan and Discharge Criteria: 





As per floor  protocol





- Smoking Cessation


Smoking Cessation Initiated: Yes

## 2017-12-28 RX ADMIN — Medication SCH TAB: at 14:06

## 2017-12-28 NOTE — PCM.PYCHPN
Psychiatric Progress Note





- Psychiatric Progress Note


Patient seen today, length of contact: 15 minutes


Patient Chief Complaint: 





"I'm feeling better."


Problems Identified/Issues Discussed: 





The pt is seen and evaluated. chart reviewed, case discussed with staff. 

Patient reported methadone help him and heroine withdrawal symptoms. 


The pt is compliant with medications and reports no side-effects. Symptoms are 

improving but needs more time to stabilize.


After care discussed, support and psychoeducation given.


Medication Change: Yes (Methadone taper, Ativan taper)


Medical Record Reviewed: Yes


Consults ordered or reviewed: 





No





Mental Status Examination





- Cognitive Function


Orientation: Person, Place, Situation, Time


Memory: Intact


Attention: WNL


Concentration: WNL


Association: WN


Fund of Knowledge: [facility]


Decription of patient's judgement and insights: 





Fair/fair


Addtional comments: 





He is cooperative and answers the questions appropriately





- Mood


Mood: Neutral





- Affect


Affect: Constricted





- Speech


Speech: Appropriate





- Formal Thought Process


Formal Thought Process: No Impairment


Psychotic Thoughts and Behaviors: 





Denied





- Suicidal Ideation


Suicidal Ideation: No





- Homicidal Ideation


Homicidal Ideation: No


Plan: 





No intent or plan, no access to the fire arms





Goal/Treatment Plan





- Goal/Treatment Plan


Need for Continued Stay: Discharge may exacerbated symptoms, Severe functional 

impairment


Progress Toward Problem(s) and Goals/Treatment Plan: 


Continue


Methadone and ativan detox


Gabapentin for augmentation


As needed meds and vitamins


Attend groups and activities


MI for abstinence and CBT for relapse prevention


Support and psychoeducation


Consider and encourage MAT~


Refer to after care





Estimated Date of D/C: 12/31/17





- Smoking Cessation


Smoking Cessation Initiated: Yes

## 2017-12-29 RX ADMIN — Medication SCH TAB: at 09:08

## 2017-12-29 NOTE — PCM.PYCHPN
Psychiatric Progress Note





- Psychiatric Progress Note


Patient seen today, length of contact: 15 minutes


Patient Chief Complaint: 





"My withdrawal symptoms are better."


Problems Identified/Issues Discussed: 





The pt is seen and evaluated. chart reviewed, case discussed with staff. 

Patient reported methadone is helping him in heroine withdrawal symptoms. 


The pt is compliant with medications and reports no side-effects.  Pt stated 

that he wants to be discharge from the hospital on Sunday. Symptoms are 

improving but needs more time to stabilize.


After care discussed, support and psychoeducation given.


DSM 5 Symptoms Update: 





Opioid use disorder, severe, dependence, withdrawal symptoms, 


Hypnotic, sedative/ anxiolytic use disorder, dependence, with withdrawal 

symptoms.


cocaine use d/o


Medication Change: Yes (Methadone taper, Ativan taper)


Medical Record Reviewed: Yes





Mental Status Examination





- Cognitive Function


Orientation: Person, Place, Situation, Time


Memory: Intact


Attention: WNL


Concentration: WNL


Association: WN


Fund of Knowledge: [facility]


Decription of patient's judgement and insights: 





Fair/fair


Pt is calm and cooperative





- Mood


Mood: Neutral





- Affect


Affect: Constricted





- Speech


Speech: Appropriate





- Formal Thought Process


Formal Thought Process: No Impairment


Psychotic Thoughts and Behaviors: 





Denied








- Suicidal Ideation


Suicidal Ideation: No


Plan: 





denied





- Homicidal Ideation


Homicidal Ideation: No


Plan: 





denied





Goal/Treatment Plan





- Goal/Treatment Plan


Need for Continued Stay: Discharge may exacerbated symptoms, Severe functional 

impairment


Progress Toward Problem(s) and Goals/Treatment Plan: 


Continue


Methadone and ativan detox


Gabapentin for augmentation


As needed meds and vitamins


Attend groups and activities


MI for abstinence and CBT for relapse prevention


Support and psychoeducation


Consider and encourage MAT~


Refer to after care





Estimated Date of D/C: 12/31/17





- Smoking Cessation


Smoking Cessation Initiated: Yes

## 2017-12-30 VITALS — RESPIRATION RATE: 20 BRPM

## 2017-12-30 RX ADMIN — GUAIFENESIN PRN MG: 100 SOLUTION ORAL at 06:54

## 2017-12-30 RX ADMIN — Medication SCH TAB: at 09:14

## 2017-12-31 VITALS
OXYGEN SATURATION: 98 % | SYSTOLIC BLOOD PRESSURE: 145 MMHG | DIASTOLIC BLOOD PRESSURE: 85 MMHG | TEMPERATURE: 98.5 F | HEART RATE: 98 BPM

## 2017-12-31 RX ADMIN — GUAIFENESIN PRN MG: 100 SOLUTION ORAL at 10:17

## 2017-12-31 RX ADMIN — Medication SCH TAB: at 09:43

## 2018-01-01 NOTE — PCM.PYCHPN
Psychiatric Progress Note





- Psychiatric Progress Note


Patient seen today, length of contact: 15 minutes


Patient Chief Complaint: 





I AM [place] AND I LEAVE [facility]


Problems Identified/Issues Discussed: 





POST ACUTE WITHDRAWAL SYNDROME. HAVING A NEW LIFE GEOGRAPHIC CURE


Medical Problems: 





NOTHING ACUTE


Diagnostic Results: 





REVIEWED


DSM 5 Symptoms Update: 





ANXIETY


Medication Change: Yes (Methadone taper, Ativan taper)


Medical Record Reviewed: Yes





Mental Status Examination





- Cognitive Function


Orientation: Person, Place, Situation, Time


Memory: Intact


Attention: WNL


Concentration: WNL


Association: WNL


Fund of Knowledge: WNL





- Mood


Mood: Neutral





- Affect


Affect: Broad, Constricted





- Speech


Speech: Appropriate





- Formal Thought Process


Formal Thought Process: No Impairment





- Suicidal Ideation


Suicidal Ideation: No





- Homicidal Ideation


Homicidal Ideation: No





Goal/Treatment Plan





- Goal/Treatment Plan


Need for Continued Stay: Discharge may exacerbated symptoms, Severe functional 

impairment


Progress Toward Problem(s) and Goals/Treatment Plan: 





OPIOID WITHDRAWAL METHADONE TAPER


OPIOID USE DISORDER GROUP MILIEU RECREATIONAL THERAPY MI CBT SUPPORTIVE 

PSYCHOTHERAPY


Estimated Date of D/C: 12/31/17





- Smoking Cessation


Smoking Cessation Initiated: Yes

## 2018-11-21 ENCOUNTER — HOSPITAL ENCOUNTER (INPATIENT)
Dept: HOSPITAL 31 - C.ER | Age: 58
LOS: 5 days | Discharge: HOME | DRG: 895 | End: 2018-11-26
Attending: PSYCHIATRY & NEUROLOGY | Admitting: PSYCHIATRY & NEUROLOGY
Payer: MEDICARE

## 2018-11-21 VITALS — BODY MASS INDEX: 25.1 KG/M2

## 2018-11-21 DIAGNOSIS — F17.210: ICD-10-CM

## 2018-11-21 DIAGNOSIS — F11.23: Primary | ICD-10-CM

## 2018-11-21 DIAGNOSIS — F12.90: ICD-10-CM

## 2018-11-21 DIAGNOSIS — F32.9: ICD-10-CM

## 2018-11-21 DIAGNOSIS — F14.20: ICD-10-CM

## 2018-11-21 LAB
ALBUMIN SERPL-MCNC: 4.7 [, G/DL] (ref 3.5–5)
ALBUMIN/GLOB SERPL: 1.7 [,] (ref 1–2.1)
ALT SERPL-CCNC: 12 [, U/L] (ref 21–72)
AST SERPL-CCNC: 31 [, U/L] (ref 17–59)
BACTERIA #/AREA URNS HPF: (no result) [,]
BASOPHILS # BLD AUTO: 0.1 [, K/UL] (ref 0–0.2)
BASOPHILS NFR BLD: 0.6 [, %] (ref 0–2)
BILIRUB UR-MCNC: NEGATIVE [,]
BUN SERPL-MCNC: 13 [, MG/DL] (ref 9–20)
CALCIUM SERPL-MCNC: 10.1 [, MG/DL] (ref 8.6–10.4)
CAOX CRY #/AREA URNS HPF: (no result) [, /HPF]
EOSINOPHIL # BLD AUTO: 0.2 [, K/UL] (ref 0–0.7)
EOSINOPHIL NFR BLD: 1.7 [, %] (ref 0–4)
ERYTHROCYTE [DISTWIDTH] IN BLOOD BY AUTOMATED COUNT: 13.8 [, %] (ref 11.5–14.5)
GFR NON-AFRICAN AMERICAN: > 60 [,]
GLUCOSE UR STRIP-MCNC: NORMAL [, MG/DL]
HGB BLD-MCNC: 15.7 [, G/DL] (ref 12–18)
LEUKOCYTE ESTERASE UR-ACNC: (no result) [, LEU/UL]
LYMPHOCYTES # BLD AUTO: 2.3 [, K/UL] (ref 1–4.3)
LYMPHOCYTES NFR BLD AUTO: 19.2 [, %] (ref 20–40)
MCH RBC QN AUTO: 31.8 [, PG] (ref 27–31)
MCHC RBC AUTO-ENTMCNC: 34.1 [, G/DL] (ref 33–37)
MCV RBC AUTO: 93.4 [, FL] (ref 80–94)
MONOCYTES # BLD: 0.8 [, K/UL] (ref 0–0.8)
MONOCYTES NFR BLD: 6.8 [, %] (ref 0–10)
NEUTROPHILS # BLD: 8.8 [, K/UL] (ref 1.8–7)
NEUTROPHILS NFR BLD AUTO: 71.7 [, %] (ref 50–75)
NRBC BLD AUTO-RTO: 0.1 [, %] (ref 0–2)
PH UR STRIP: 5 [,] (ref 5–8)
PLATELET # BLD: 259 [, K/UL] (ref 130–400)
PMV BLD AUTO: 7.6 [, FL] (ref 7.2–11.7)
PROT UR STRIP-MCNC: NEGATIVE [, MG/DL]
RBC # BLD AUTO: 4.93 [, MIL/UL] (ref 4.4–5.9)
RBC # UR STRIP: NEGATIVE [,]
SP GR UR STRIP: 1.03 [,] (ref 1–1.03)
SQUAMOUS EPITHIAL: < 1 [, /HPF] (ref 0–5)
UROBILINOGEN UR-MCNC: 2 [, MG/DL] (ref 0.2–1)
WBC # BLD AUTO: 12.2 [, K/UL] (ref 4.8–10.8)

## 2018-11-21 PROCEDURE — HZ46ZZZ GROUP COUNSELING FOR SUBSTANCE ABUSE TREATMENT, PSYCHOEDUCATION: ICD-10-PCS | Performed by: PSYCHIATRY & NEUROLOGY

## 2018-11-21 PROCEDURE — HZ80ZZZ MEDICATION MANAGEMENT FOR SUBSTANCE ABUSE TREATMENT, NICOTINE REPLACEMENT: ICD-10-PCS | Performed by: PSYCHIATRY & NEUROLOGY

## 2018-11-21 PROCEDURE — HZ59ZZZ INDIVIDUAL PSYCHOTHERAPY FOR SUBSTANCE ABUSE TREATMENT, SUPPORTIVE: ICD-10-PCS | Performed by: PSYCHIATRY & NEUROLOGY

## 2018-11-21 PROCEDURE — HZ2ZZZZ DETOXIFICATION SERVICES FOR SUBSTANCE ABUSE TREATMENT: ICD-10-PCS | Performed by: PSYCHIATRY & NEUROLOGY

## 2018-11-21 PROCEDURE — GZ3ZZZZ MEDICATION MANAGEMENT: ICD-10-PCS | Performed by: PSYCHIATRY & NEUROLOGY

## 2018-11-21 NOTE — PCM.BM
<Rico Ortez - Last Filed: 11/21/18 22:53>





Treatment Plan Problems





- Problems identified on initial assessmt


  ** potential for opiate withdrawal


Date Initiated: 11/21/18


Time Initiated: 22:53


Status: Active





Treatment assets and liabiliti


Patient Assests: cooperative, ADL independent, negotiates basic needs, 

cognitively intact


Patient Liabilities: substance abuse





- Milieu Protocol


Maintain good personal hygiene: daily Encourage regular showers, daily Remind 

patient to perform daily oral care, daily Assist patient to perform ADL's


Conduct patient checks and document Observation sheet: Q15 minutes


Maintain personal safety: every shift Educate patient to report safety concerns 

to staff, every shift Monitor environment for contraband/sharps


Medication safety: Monitor for expected outcome, potential side effects: every 

shift, Assess barriers to learning: every shift, Assess readiness for medication

education: every shift





<Jihan Phoenix - Last Filed: 11/22/18 12:51>





- Diagnosis


(1) Opioid dependence with withdrawal


Status: Acute   


Interventions: 





11/22/18 12:51


* Assess 7x/week regarding severity of withdrawal


* Educate regarding risks, benefits, side effects and alternatives of 

  medications


* Use Motivational Interviewing for abstinence


* Use CBT for relapse prevention


* Medication management for withdrawal symptoms


* Encourage medication assisted treatment


*

## 2018-11-21 NOTE — C.PDOC
History Of Present Illness


58 year old male presents to the ED requesting detox after cocaine, heroin and 

marijuana use. Patient states his last use was one hour prior to arrival. 

Patient states he last underwent detox in April, but then relapsed in June. Pao

ent denies suicidal/homicidal ideation. 


Time Seen by Provider: 11/21/18 20:53


Chief Complaint (Nursing): Substance Abuse


History Per: Patient


History/Exam Limitations: no limitations


Onset/Duration Of Symptoms: Hrs


Current Symptoms Are (Timing): Still Present


Suicide/Self Injury Attempted (Context): None


Modifying Factor(s): Marijuana, Cocaine, Other (heroin)


Associated Symptoms: denies: Suicidal Thoughts, Suicidal Plan


Involuntary Hold By: None


Recent travel outside of the [place]: No


Additional History Per: Patient





Past Medical History


Reviewed: Historical Data, Nursing Documentation, Vital Signs


Vital Signs: 





                                Last Vital Signs











Temp  97.8 F   11/21/18 20:42


 


Pulse  112 H  11/21/18 20:42


 


Resp  20   11/21/18 20:42


 


BP  145/83   11/21/18 20:42


 


Pulse Ox  100   11/21/18 20:42














- Medical History


PMH: Back Problems


   Denies: Diabetes, Hepatitis, HIV, HTN, Seizures, Sexually Transmitted Disease


Surgical History: No Surg Hx





- [facility] Procedures











DETOXIFICATION SERVICES FOR SUBSTANCE ABUSE TREATMENT (12/26/17)


DRUG DETOXIFICATION (01/09/15)


GROUP  FOR SUBSTANCE ABUSE TREATMENT, PSYCHOEDUCATION (12/26/17)


GROUP PSYCHOTHERAPY (04/10/17)


INDIV PSYCHOTHERAPY FOR SUBSTANCE ABUSE TREATMENT, SUPPORT (12/26/17)


INDIVIDUAL PSYCHOTHERAPY, SUPPORTIVE (04/10/17)








Family History: States: Unknown Family Hx, Stroke





- Social History


Hx Tobacco Use: Yes


Hx Alcohol Use: No


Hx Substance Use: Yes





- Immunization History


Hx Tetanus Toxoid Vaccination: No


Hx Influenza Vaccination: No


Hx Pneumococcal Vaccination: No





Review Of Systems


Psych: Positive for: Other (cocaine, heroin, marijuana detox ).  Negative for: 

Suicidal ideation





Physical Exam





- Physical Exam


Appears: Non-toxic, No Acute Distress


Skin: Normal Color, Warm, Dry


Head: Atraumatic, Normacephalic


Eye(s): bilateral: Normal Inspection


Oral Mucosa: Moist


Neck: Supple


Chest: Symmetrical, No Deformity, No Tenderness


Cardiovascular: Rhythm Regular, No Murmur


Respiratory: Normal Breath Sounds, No Rales, No Rhonchi, No Wheezing


Extremity: Normal ROM, Capillary Refill (less than 2 seconds )


Neurological/Psych: Oriented x3, Normal Speech, Normal Cognition





ED Course And Treatment





- Laboratory Results


Result Diagrams: 


                                 11/21/18 21:08





                                 11/21/18 21:08


Lab Interpretation: No Acute Changes


O2 Sat by Pulse Oximetry: 100 (on RA)


Pulse Ox Interpretation: Normal


Progress Note: Bloodwork and urinalysis ordered and reviewed.


Reevaluation Time: 21:43


Reassessment Condition: Improved (Patient is medically cleared for detox 

admission.)





Disposition





- Disposition


Disposition: HOSPITALIZED


Disposition Time: 22:32


Condition: STABLE





- POA


Present On Arrival: None





- Clinical Impression


Clinical Impression: 


 Opioid dependence, Depression








- Scribe Statement


The provider has reviewed the documentation as recorded by the Scribe (Gunjan Hanson)


Provider Attestation: 








All medical record entries made by the Scribe were at my direction and 

personally dictated by me. I have reviewed the chart and agree that the record 

accurately reflects my personal performance of the history, physical exam, 

medical decision making, and the department course for this patient. I have also

personally directed, reviewed, and agree with the discharge instructions and 

disposition.

## 2018-11-22 RX ADMIN — Medication SCH TAB: at 12:30

## 2018-11-22 NOTE — PCM.PSYCH
Initial Psychiatric Evaluation





- Initial Psychiatric Evaluation


Type of Admission: Voluntary


Legal Status: Capacity


Chief Complaint (in patient's own words): 





Any treatment for my substance use.


History of Present Illness and Precipitating Events: 





Patient is a 58 years old, , on disability,  male with no 

previous psychiatric history was admitted due to withdrawing from heroin and 

Xanax.





Opioid: Patient started using heroin at 17 years of age, increased gradually up 

to 10 bags daily. Patient has history of sobriety for 7 years from 2000 -2012, 

relapsed in 2012 after the murder of his brother. Snorting. Last used last 

night, 5 bags. Heroin is his drug of choice. He has history of 4 previous detox 

and one rehabilitation.





Anxiolytics: He started using Xanax 3 years ago, was using 1 stick, each of 2 mg

daily twice a day. Last use was 3 days ago.





Cocaine: Started using cocaine at 17 years of age, was using less than a gram 

once a month. Last used yesterday.





Patient was born in [place], has bachelor's degree. Not working since 2013. 

He is on disability,  and has one 22 years old son. Patient lives 

alone. His height is 5 feet 9 inches and weight is 165 pounds.


Current Medications: 





Active Medications











Generic Name Dose Route Start Last Admin





  Trade Name Freq  PRN Reason Stop Dose Admin


 


Al Hydrox/Mg Hydrox/Simethicone  30 ml  11/21/18 23:30  





  Maalox 30 Ml  PO   





  TID PRN   





  Indigestion / Heartburn   





     





     





     


 


Chlordiazepoxide  25 mg  11/22/18 12:16  





  Librium  PO   





  Q4H PRN   





  Alcohol Withdrawal   





     





     





     


 


Clonidine HCl  0.1 mg  11/21/18 23:30  11/21/18 23:41





  Catapres  PO   0.1 mg





  Q4 PRN   Administration





  COWS Score More or Equal to 5   





     





     





     


 


Folic Acid  1 mg  11/22/18 12:30  11/22/18 12:30





  Folic Acid  PO   1 mg





  DAILY POONAM   Administration





     





     





     





     


 


Gabapentin  400 mg  11/22/18 14:00  11/22/18 13:48





  Neurontin  PO   400 mg





  TID POONAM   Administration





     





     





     





     


 


Hydroxyzine HCl  50 mg  11/21/18 23:31  11/21/18 23:39





  Atarax  PO   50 mg





  Q6H PRN   Administration





  Anxiety   





     





     





     


 


Ibuprofen  600 mg  11/21/18 23:31  11/21/18 23:40





  Motrin Tab  PO   600 mg





  Q6H PRN   Administration





  Pain, moderate (4-7)   





     





     





     


 


Loperamide HCl  2 mg  11/21/18 23:30  





  Imodium  PO   





  Q8 PRN   





  Diarrhea   





     





     





     


 


Multivitamins  1 tab  11/22/18 12:30  11/22/18 12:30





  Hexavitamin  PO   1 tab





  DAILY POONAM   Administration





     





     





     





     


 


Ondansetron HCl  4 mg  11/21/18 23:30  





  Zofran Tab  PO   





  Q8 PRN   





  Nausea/Vomiting   





     





     





     


 


Thiamine HCl  100 mg  11/22/18 12:30  11/22/18 12:30





  Vitamin B1 Tab  PO   100 mg





  DAILY POONAM   Administration





     





     





     





     


 


Trazodone HCl  100 mg  11/21/18 23:31  11/21/18 23:41





  Desyrel  PO   100 mg





  HS PRN   Administration





  Insomnia   





     





     





     














Past Psychiatric History





- Past Psychiatric History


Previous Treatment History: Inpatient


Prior Professional Help: History of 4 detox and one rehabilitation in the past.


History of Abuse: 





None reported


History of ETOH/Drug Use: 





See HPI


History of Family Illness: 





None reported


Pertinent Medical Hx (Current Medical&Sleep Prob, Allergies): 





                                    Allergies











Allergy/AdvReac Type Severity Reaction Status Date / Time


 


FISH Allergy Intermediate  Verified 11/21/18 20:45


 


nut - unspecified [nut] Allergy Intermediate  Verified 11/21/18 20:45








                                        





No Known Home Med  12/26/17 











Review of Systems





- Psychiatric


Psychiatric: As Per HPI, Anxiety





Mental Status Examination





- Personal Presentation


Personal Presentation: Looks stated age





- Affect


Affect: Other (Appropriate)





- Motor Activity


Motor Activity: Calm





- Reliability in Providing Information


Reliability in Providing Information: Fair





- Speech


Speech: Organized





- Mood


Mood: Anxious





- Formal Thought Process


Formal Thought Process: No Impairment





- Hallucinations/Delusions


Hallucinations: Other (None reported)


Delusions: Other





- Obsessions/Compulsions


Obsessions: None


Compulsions: None





- Cognitive Functions


Orientation: Person, Place, Situation, Time


Sensorium: Alert


Attention/Concentration: Attentive


Abstract Thinking: [place]


Estimate of Intelligence: Average


Judgement: Intact, as evidence by: Insight regarding need for hospitalization


Memory: Recent intact, as evidence by: Ability to recall events of the day, 

Remote intact, as evidenced by: Ability to recall historical events





- Risk


Risk: Withdrawal, Diminished functioning





- Strength & Assets Inventory


Strength & Assets Inventory: Education, Cooperative





- Limitations


Limitations: Living alone





DSM 5 DX





- DSM 5


DSM 5 Diagnosis: 





Opioid use disorder severe


Angiolytic use disorder severe


Cocaine use disorder moderate





- Recommended/Plan of Treatment


Treatment Recommendations and Plan of Treatment: 





Patient education.


Supportive therapy.


CBT for relapse prevention.


MI for abstinence.


We'll start methadone for opiate withdrawal symptoms.


We'll start other when necessary medications.


Patient wants to go to inpatient rehabilitation for follow-up care after 

discharge from the hospital.


Projected ELOS: 4-5 days





- Smoking Cessation


Smoking Cessation Initiated: Yes

## 2018-11-23 RX ADMIN — Medication SCH TAB: at 10:16

## 2018-11-23 NOTE — PCM.[email or web address]
Psychiatric Progress Note





- Psychiatric Progress Note


Patient seen today, length of contact: 16 min


Patient Chief Complaint: 





"I am not well"


Problems Identified/Issues Discussed: 





The pt is seen, chart reviewed, case discussed with staff.


The pt is compliant with medications and reports no side-effects.


Symptoms are improving but needs more time to stabilize. 


Pt attends groups and activities.


Support given, psycho-education provided. 


After care discussed.


Medication Change: Yes (detox changes daily)


Medical Record Reviewed: Yes





Mental Status Examination





- Cognitive Function


Orientation: Person, Place, Situation, Time


Memory: Intact


Attention: WNL


Concentration: Poor


Association: WNL


Fund of Knowledge: WNL





- Mood


Mood: Depressed, Anxious





- Affect


Affect: Constricted





- Speech


Speech: Appropriate





- Formal Thought Process


Formal Thought Process: No Impairment





- Suicidal Ideation


Suicidal Ideation: No





- Homicidal Ideation


Homicidal Ideation: No





Goal/Treatment Plan





- Goal/Treatment Plan


Need for Continued Stay: Discharge may exacerbated symptoms, Severe functional 

impairment


Progress Toward Problem(s) and Goals/Treatment Plan: 





Taper continues


Gabapentin for augmentation


As needed medications


All risks, benefits and alternatives of the meds discussed,


 and the pt agreed and understood. 


Attend groups and activities


Supportive therapy and psychoeducation


MI for abstinence


CBT for relapse prevention


Encourage MAT


Refer to rehab or IOP, and self-help groups


Teach healthy lifestyle methods, i.e. diet, exercise, meditation


Smoking cessation with MI


Nicotine patch if needed





34 min

## 2018-11-24 RX ADMIN — Medication SCH TAB: at 09:27

## 2018-11-24 NOTE — RAD
Date of service: 



11/24/2018



HISTORY:

 rehab placement 



COMPARISON:

1/9/2015



TECHNIQUE:

Chest PA and lateral



FINDINGS:



LUNGS:

No active pulmonary disease.



PLEURA:

No significant pleural effusion identified. No pneumothorax apparent.



CARDIOVASCULAR:

No aortic atherosclerotic calcification present.



Normal cardiac size. No pulmonary vascular congestion. 



OSSEOUS STRUCTURES:

No significant abnormalities.



VISUALIZED UPPER ABDOMEN:

Normal.



OTHER FINDINGS:

None.



IMPRESSION:

No active disease.

## 2018-11-24 NOTE — PCM.PYCHPN
Psychiatric Progress Note





- Psychiatric Progress Note


Patient seen today, length of contact: 16 min


Patient Chief Complaint: 





Any treatment for my substance use.


Problems Identified/Issues Discussed: 





Patient seen, chart reviewed, case discussed with the staff.





Issues related to illness and treatment were discussed with the patient and 

staff.





Reported compliant with treatment with no adverse effects.





Tolerating treatment very well.





Reported feeling better, has very few withdrawal symptoms including body aches 

and sleeping difficulties.  Otherwise feeling better.





Awake, alert and confused





Calm and cooperative with good eye contact.





Mood reported as okay.





Affect appropriate. 





Treatment discussed with the patient.





Needs more time for stabilization.





Aftercare discussed with the patient.





Denied any delusions, auditory or visual hallucinations, suicidal ideations or 

homicidal ideations at the time of evaluation.


Medical Problems: 





None reported


Diagnostic Results: 





Reviewed


DSM 5 Symptoms Update: 





Some improvement with treatment 


Medication Change: No


Medical Record Reviewed: Yes





Mental Status Examination





- Cognitive Function


Orientation: Person, Place, Situation, Time


Memory: Intact


Attention: WNL


Concentration: WNL


Association: WN


Fund of Knowledge: [facility]


Decription of patient's judgement and insights: 





Fair





- Mood


Mood: Anxious (Less than before)





- Affect


Affect: Other (Appropriate)





- Speech


Speech: Appropriate





- Formal Thought Process


Formal Thought Process: No Impairment


Psychotic Thoughts and Behaviors: 





None





- Suicidal Ideation


Suicidal Ideation: No





- Homicidal Ideation


Homicidal Ideation: No





Goal/Treatment Plan





- Goal/Treatment Plan


Need for Continued Stay: Remain at risks for inpatient hospitalization, 

Discharge may exacerbated symptoms, Severe functional impairment


Progress Toward Problem(s) and Goals/Treatment Plan: 





Patient education.


Supportive therapy.


CBT for relapse prevention.


MI for abstinence.


Continue treatment as before.


Patient wants to go to inpatient rehabilitation for follow-up care after 

discharge from the hospital.


Estimated Date of D/C: 11/26/18





- Smoking Cessation


Smoking Cessation Initiated: Yes

## 2018-11-25 RX ADMIN — Medication SCH TAB: at 09:34

## 2018-11-26 VITALS
HEART RATE: 92 BPM | SYSTOLIC BLOOD PRESSURE: 119 MMHG | OXYGEN SATURATION: 98 % | DIASTOLIC BLOOD PRESSURE: 75 MMHG | TEMPERATURE: 98.1 F | RESPIRATION RATE: 20 BRPM

## 2018-11-26 RX ADMIN — Medication SCH TAB: at 09:12

## 2018-11-26 NOTE — PCM.PYCHDC
Mental Status Examination





- Mental Status Examination


Orientation: Person





Discharge Summary





- Discharge Note


Consultations:: List each consultation separately and include:  1. Reason for 

request.  2. Findings.  3. Follow-up


Summary of Hospital Course include:: 1. Description of specific treatment plan 

utilized for patients during their course of treatmen.  2. Summarize the time-

course for resolution of acute symptoms and/or regressed behaviors.  3. Describe

issues identified and worked on during hospitalization.  4. Describe medication 

utilized.  5. Describe medical problems identified and treated.  6. Reassessment

of suicide risk


Summary of Hospital Course: 














He went to [facility] rehab.





- Diagnosis


(1) Opioid dependence with withdrawal


Current Visit: No   Status: Acute   





- Final Diagnosis (DSM 5)


Condition upon Discharge: STABLE


Disposition: HOME/ ROUTINE


Follow-up Treatment Plan: 





Taper continues


Gabapentin for augmentation


As needed medications


All risks, benefits and alternatives of the meds discussed,


 and the pt agreed and understood. 


Attend groups and activities


Supportive therapy and psychoeducation


MI for abstinence


CBT for relapse prevention


Encourage MAT


Refer to rehab or IOP, and self-help groups


Teach healthy lifestyle methods, i.e. diet, exercise, meditation


Smoking cessation with MI


Nicotine patch if needed





34 min


Prescriptions/Medication Reconciliation: 


Gabapentin [Neurontin] 400 mg PO TID #90 cap


hydrOXYzine HCl [Atarax] 50 mg PO DAILY PRN #30 tab


 PRN Reason: Anxiety


traZODone [Desyrel] 100 mg PO HS PRN #30 tab


 PRN Reason: Insomnia